# Patient Record
Sex: MALE | URBAN - METROPOLITAN AREA
[De-identification: names, ages, dates, MRNs, and addresses within clinical notes are randomized per-mention and may not be internally consistent; named-entity substitution may affect disease eponyms.]

---

## 2018-09-27 RX ORDER — NICOTINE POLACRILEX 2 MG
1 LOZENGE BUCCAL DAILY
COMMUNITY

## 2018-09-27 RX ORDER — ESCITALOPRAM OXALATE 10 MG/1
5 TABLET ORAL DAILY
COMMUNITY

## 2018-09-27 RX ORDER — QUETIAPINE FUMARATE 50 MG/1
50 TABLET, FILM COATED ORAL 2 TIMES DAILY
COMMUNITY

## 2018-09-27 RX ORDER — BUPROPION HYDROCHLORIDE 300 MG/1
300 TABLET ORAL EVERY MORNING
COMMUNITY

## 2018-09-27 RX ORDER — ZIPRASIDONE HYDROCHLORIDE 20 MG/1
20 CAPSULE ORAL 2 TIMES DAILY WITH MEALS
COMMUNITY

## 2018-09-27 RX ORDER — RISPERIDONE 0.5 MG/1
0.5 TABLET, FILM COATED ORAL EVERY EVENING
COMMUNITY

## 2018-09-27 RX ORDER — LEVOTHYROXINE SODIUM 0.07 MG/1
75 TABLET ORAL
COMMUNITY

## 2018-09-27 RX ORDER — CLONAZEPAM 0.5 MG/1
0.5 TABLET ORAL 2 TIMES DAILY
COMMUNITY

## 2018-09-27 NOTE — PRE-PROCEDURE INSTRUCTIONS
My Surgical Experience    The following information was developed to assist you to prepare for your operation  What do I need to do before coming to the hospital?   Arrange for a responsible person to drive you to and from the hospital    Arrange care for your children at home  Children are not allowed in the recovery areas of the hospital   Plan to wear clothing that is easy to put on and take off  If you are having shoulder surgery, wear a shirt that buttons or zippers in the front  Bathing  o Shower the evening before and the morning of your surgery with an antibacterial soap  Please refer to the Pre Op Showering Instructions for Surgery Patients Sheet   o Remove nail polish and all body piercing jewelry  o Do not shave any body part for at least 24 hours before surgery-this includes face, arms, legs and upper body  Food  o Nothing to eat or drink after midnight the night before your surgery  This includes candy and chewing gum  o Exception: If your surgery is after 12:00pm (noon), you may have clear liquids such as 7-Up®, ginger ale, apple or cranberry juice, Jell-O®, water, or clear broth until 8:00 am  o Do not drink milk or juice with pulp on the morning before surgery  o Do not drink alcohol 24 hours before surgery  Medicine  o Follow instructions you received from your surgeon about which medicines you may take on the day of surgery  o If instructed to take medicine on the morning of surgery, take pills with just a small sip of water  Call your prescribing doctor for specific infroamtion on what to do if you take insulin    What should I bring to the hospital?    Bring:  Abilio Zelaya or a walker, if you have them, for foot or knee surgery   A list of the daily medicines, vitamins, minerals, herbals and nutritional supplements you take   Include the dosages of medicines and the time you take them each day   Glasses, dentures or hearing aids   Minimal clothing; you will be wearing hospital sleepwear   Photo ID; required to verify your identity   If you have a Living Will or Power of , bring a copy of the documents   If you have an ostomy, bring an extra pouch and any supplies you use    Do not bring   Medicines or inhalers   Money, valuables or jewelry    What other information should I know about the day of surgery?  Notify your surgeons if you develop a cold, sore throat, cough, fever, rash or any other illness   Report to the Ambulatory Surgical/Same Day Surgery Unit   You will be instructed to stop at Registration only if you have not been pre-registered   Inform your  fi they do not stay that they will be asked by the staff to leave a phone number where they can be reached   Be available to be reached before surgery  In the event the operating room schedule changes, you may be asked to come in earlier or later than expected    *It is important to tell your doctor and others involved in your health care if you are taking or have been taking any non-prescription drugs, vitamins, minerals, herbals or other nutritional supplements  Any of these may interact with some food or medicines and cause a reaction      Pre-Surgery Instructions:   Medication Instructions    buPROPion (WELLBUTRIN XL) 300 mg 24 hr tablet Instructed patient per Anesthesia Guidelines   camphor-menthol (SARNA) lotion Instructed patient per Anesthesia Guidelines   clonazePAM (KlonoPIN) 0 5 mg tablet Instructed patient per Anesthesia Guidelines   escitalopram (LEXAPRO) 10 mg tablet Instructed patient per Anesthesia Guidelines   levothyroxine 75 mcg tablet Instructed patient per Anesthesia Guidelines   multivitamin-iron-minerals-folic acid (THERAPEUTIC-M) TABS tablet Instructed patient per Anesthesia Guidelines   QUEtiapine (SEROquel) 50 mg tablet Instructed patient per Anesthesia Guidelines   risperiDONE (RisperDAL) 0 5 mg tablet Instructed patient per Anesthesia Guidelines      Sunscreens (AVEENO DAILY MOISTURIZER) LOTN Instructed patient per Anesthesia Guidelines   ziprasidone (GEODON) 20 mg capsule Instructed patient per Anesthesia Guidelines  To take wellbutrin, klonopin, lexapro, synthroid, seroquel and geodon a tanvir   Of surgery

## 2018-09-28 ENCOUNTER — ANESTHESIA EVENT (OUTPATIENT)
Dept: PERIOP | Facility: HOSPITAL | Age: 38
End: 2018-09-28
Payer: COMMERCIAL

## 2018-09-28 NOTE — ANESTHESIA PREPROCEDURE EVALUATION
Disease of thyroid gland hypothyroid       Review of Systems/Medical History  Patient summary reviewed  Chart reviewed      Cardiovascular  Hypertension ,    Pulmonary  Negative pulmonary ROS        GI/Hepatic  Negative GI/hepatic ROS          Negative  ROS        Endo/Other  History of thyroid disease , hypothyroidism,      GYN       Hematology  Negative hematology ROS      Musculoskeletal  Negative musculoskeletal ROS        Neurology  Negative neurology ROS      Psychology   Anxiety,     Comment: Mood disorder (HCC)   OCD (obsessive compulsive disorder)          Physical Exam    Airway    Mallampati score: II  TM Distance: >3 FB  Neck ROM: full     Dental       Cardiovascular  Rhythm: regular, Rate: normal,     Pulmonary  Breath sounds clear to auscultation,     Other Findings        Anesthesia Plan  ASA Score- 2     Anesthesia Type- general with ASA Monitors  Additional Monitors:   Airway Plan: ETT  Plan Factors-    Induction- intravenous  Postoperative Plan-   Planned trial extubation    Informed Consent- Anesthetic plan and risks discussed with patient  I personally reviewed this patient with the CRNA  Discussed and agreed on the Anesthesia Plan with the CHELI Burton

## 2018-10-01 PROBLEM — K05.30 ADULT PERIODONTITIS: Chronic | Status: ACTIVE | Noted: 2018-10-01

## 2018-10-02 ENCOUNTER — HOSPITAL ENCOUNTER (OUTPATIENT)
Facility: HOSPITAL | Age: 38
Setting detail: OUTPATIENT SURGERY
Discharge: HOME HEALTH | End: 2018-10-02
Attending: DENTIST | Admitting: DENTIST
Payer: COMMERCIAL

## 2018-10-02 ENCOUNTER — HOSPITAL ENCOUNTER (OUTPATIENT)
Dept: RADIOLOGY | Facility: HOSPITAL | Age: 38
Setting detail: OUTPATIENT SURGERY
Discharge: HOME | End: 2018-10-02
Payer: COMMERCIAL

## 2018-10-02 ENCOUNTER — ANESTHESIA (OUTPATIENT)
Dept: PERIOP | Facility: HOSPITAL | Age: 38
End: 2018-10-02
Payer: COMMERCIAL

## 2018-10-02 VITALS
DIASTOLIC BLOOD PRESSURE: 73 MMHG | HEART RATE: 67 BPM | OXYGEN SATURATION: 98 % | RESPIRATION RATE: 18 BRPM | TEMPERATURE: 96.7 F | BODY MASS INDEX: 25.01 KG/M2 | SYSTOLIC BLOOD PRESSURE: 127 MMHG | WEIGHT: 165 LBS | HEIGHT: 68 IN

## 2018-10-02 PROBLEM — K05.30 ADULT PERIODONTITIS: Chronic | Status: RESOLVED | Noted: 2018-10-01 | Resolved: 2018-10-02

## 2018-10-02 RX ORDER — ONDANSETRON 2 MG/ML
INJECTION INTRAMUSCULAR; INTRAVENOUS AS NEEDED
Status: DISCONTINUED | OUTPATIENT
Start: 2018-10-02 | End: 2018-10-02 | Stop reason: SURG

## 2018-10-02 RX ORDER — PROPOFOL 10 MG/ML
INJECTION, EMULSION INTRAVENOUS AS NEEDED
Status: DISCONTINUED | OUTPATIENT
Start: 2018-10-02 | End: 2018-10-02 | Stop reason: SURG

## 2018-10-02 RX ORDER — MAGNESIUM HYDROXIDE 1200 MG/15ML
LIQUID ORAL AS NEEDED
Status: DISCONTINUED | OUTPATIENT
Start: 2018-10-02 | End: 2018-10-02 | Stop reason: HOSPADM

## 2018-10-02 RX ORDER — SODIUM CHLORIDE, SODIUM LACTATE, POTASSIUM CHLORIDE, CALCIUM CHLORIDE 600; 310; 30; 20 MG/100ML; MG/100ML; MG/100ML; MG/100ML
INJECTION, SOLUTION INTRAVENOUS CONTINUOUS PRN
Status: DISCONTINUED | OUTPATIENT
Start: 2018-10-02 | End: 2018-10-02 | Stop reason: SURG

## 2018-10-02 RX ORDER — CHLORHEXIDINE GLUCONATE 0.12 MG/ML
RINSE ORAL AS NEEDED
Status: DISCONTINUED | OUTPATIENT
Start: 2018-10-02 | End: 2018-10-02 | Stop reason: HOSPADM

## 2018-10-02 RX ORDER — CLINDAMYCIN PHOSPHATE 600 MG/50ML
600 INJECTION INTRAVENOUS ONCE
Status: COMPLETED | OUTPATIENT
Start: 2018-10-02 | End: 2018-10-02

## 2018-10-02 RX ORDER — CLINDAMYCIN PHOSPHATE 600 MG/50ML
600 INJECTION INTRAVENOUS
Status: DISCONTINUED | OUTPATIENT
Start: 2018-10-02 | End: 2018-10-02 | Stop reason: HOSPADM

## 2018-10-02 RX ORDER — ROCURONIUM BROMIDE 10 MG/ML
INJECTION, SOLUTION INTRAVENOUS AS NEEDED
Status: DISCONTINUED | OUTPATIENT
Start: 2018-10-02 | End: 2018-10-02 | Stop reason: SURG

## 2018-10-02 RX ORDER — ONDANSETRON 2 MG/ML
4 INJECTION INTRAMUSCULAR; INTRAVENOUS ONCE AS NEEDED
Status: DISCONTINUED | OUTPATIENT
Start: 2018-10-02 | End: 2018-10-02 | Stop reason: HOSPADM

## 2018-10-02 RX ORDER — BUPIVACAINE HYDROCHLORIDE AND EPINEPHRINE 5; 5 MG/ML; UG/ML
INJECTION, SOLUTION PERINEURAL AS NEEDED
Status: DISCONTINUED | OUTPATIENT
Start: 2018-10-02 | End: 2018-10-02 | Stop reason: HOSPADM

## 2018-10-02 RX ORDER — SODIUM CHLORIDE, SODIUM LACTATE, POTASSIUM CHLORIDE, CALCIUM CHLORIDE 600; 310; 30; 20 MG/100ML; MG/100ML; MG/100ML; MG/100ML
100 INJECTION, SOLUTION INTRAVENOUS CONTINUOUS
Status: DISCONTINUED | OUTPATIENT
Start: 2018-10-02 | End: 2018-10-02 | Stop reason: HOSPADM

## 2018-10-02 RX ORDER — FENTANYL CITRATE/PF 50 MCG/ML
25 SYRINGE (ML) INJECTION
Status: DISCONTINUED | OUTPATIENT
Start: 2018-10-02 | End: 2018-10-02 | Stop reason: HOSPADM

## 2018-10-02 RX ADMIN — PROPOFOL 200 MG: 10 INJECTION, EMULSION INTRAVENOUS at 09:28

## 2018-10-02 RX ADMIN — ONDANSETRON 4 MG: 2 INJECTION INTRAMUSCULAR; INTRAVENOUS at 09:40

## 2018-10-02 RX ADMIN — ROCURONIUM BROMIDE 35 MG: 10 INJECTION INTRAVENOUS at 09:28

## 2018-10-02 RX ADMIN — DEXAMETHASONE SODIUM PHOSPHATE 4 MG: 10 INJECTION INTRAMUSCULAR; INTRAVENOUS at 09:40

## 2018-10-02 RX ADMIN — CLINDAMYCIN PHOSPHATE 600 MG: 12 INJECTION, SOLUTION INTRAMUSCULAR; INTRAVENOUS at 09:32

## 2018-10-02 RX ADMIN — SODIUM CHLORIDE, SODIUM LACTATE, POTASSIUM CHLORIDE, AND CALCIUM CHLORIDE: .6; .31; .03; .02 INJECTION, SOLUTION INTRAVENOUS at 09:21

## 2018-10-02 NOTE — ANESTHESIA POSTPROCEDURE EVALUATION
Post-Op Assessment Note      CV Status:  Stable    Mental Status:  Alert and awake    Hydration Status:  Euvolemic    PONV Controlled:  Controlled    Airway Patency:  Patent    Post Op Vitals Reviewed: Yes          Staff: Anesthesiologist           /74 (10/02/18 1115)    Temp     Pulse 69 (10/02/18 1115)   Resp 18 (10/02/18 1115)    SpO2 96 % (10/02/18 1115)

## 2018-10-02 NOTE — OP NOTE
OPERATIVE REPORT  PATIENT NAME: Samaria Perla    :  1980  MRN: [id number]  Pt Location: [facility] OR ROOM 02    SURGERY DATE: 10/2/2018    Surgeon(s) and Role:     * Jessica Hurley, DMD - Primary    Preop Diagnosis:  Chronic periodontitis [K05 30]  Intellectual disability [F79]    Post-Op Diagnosis Codes:     * Chronic periodontitis [K05 30]     * Intellectual disability [F79]    Procedure(s) (LRB):  COMPLETE ORAL REHABILITATION, DENTAL EXAM, FULL MOUTH  X-RAYS, PLANING AND SCALING, GINGEVECTOMY, EXTRACTION TOOTH #15, IMPRESSIONS X2, PERIO CHARTING, PROPHYLAXIS, D/SENSE, FLUORIDE  (N/A)    Specimen(s):  * No specimens in log *    Estimated Blood Loss:   Minimal    Drains:       Anesthesia Type:   General    Operative Indications:  Chronic periodontitis [K05 30]  Intellectual disability [F79]  Moderate periodontal disease  Extensive decay # 15  Operative Findings:      Complications:   None    Procedure and Technique: Following the induction of IV inhalation anesthesia with nasotracheal intubation patient was prepped and draped for an intraoral dental surgical procedure  14 periapical dental radiographs were exposed and stored  Preliminary treatment plan formulated at that time  At this time a meeting was held with the legal guardian and parent Mrs Schoen gold to discuss treatment option for tooth 15  Radiographic evidence of extensive decay necessitate surgical extraction  She agreed to treatment as presented  Following insertion of a posterior pharyngeal pack with Ray-Jonathan gauze single tail moist and debridement of the dentition was completed utilizing ultrasonic scaling with the 30 KHZ Cavitron unit  Both large and slim tip inserts were utilized  Handheld curettes and scale ears were used where appropriate  Comprehensive evaluation of the oral cavity and dentition was then completed    This information was related to the aforementioned radiographic survey whereby definitive treatment plan was then set forth  Following administration of 3 6 mL Marcaine 0 5% epi concentration 1:200,000 the patient had surgical extraction of tooth 15  Gel-Foam and 2 (3-0) Vicryl sutures employed for closure  Patient also had adult prophylaxis, desensitizing Medica med and fluoride treatment performed for all remaining teeth  This patient received 600 mg of Cleocin intravenously during the surgery  Patient had both maxillary mandibular polyvinyl side lock seen impressions taken for study models  The posterior pharyngeal pack was removed and the oral pharyngeal area suction irrigated with sterile saline the usual manner  Patient left the operating room in satisfactory condition and was transported to PACU without incident       I was present for the entire procedure    Patient Disposition:  PACU     SIGNATURE: Randee Ellsworth DMD  DATE: October 2, 2018  TIME: 10:52 AM

## (undated) DEVICE — ACCLEAN PROPHY PASTE COARSE: Brand: HENRY SCHEIN

## (undated) DEVICE — DENTAL PACK: Brand: CARDINAL HEALTH

## (undated) DEVICE — DISPOS-A-BRUSH FINE BRISTLE

## (undated) DEVICE — GLOVE INDICATOR PI UNDERGLOVE SZ 8.5 BLUE

## (undated) DEVICE — GROUNDING PAD UNIVERSAL SLW

## (undated) DEVICE — ROCKER SWITCH PENCIL HOLSTER: Brand: VALLEYLAB

## (undated) DEVICE — DISPOS-A BRUSH STANDARD

## (undated) DEVICE — Device

## (undated) DEVICE — SPECIMEN SOCK - SHORT: Brand: MEDI-VAC

## (undated) DEVICE — SYRINGE BRIXTON AIRWATER SLEEVE CLEAR

## (undated) DEVICE — MICRODISSECTION NEEDLE: Brand: COLORADO

## (undated) DEVICE — ASTOUND STANDARD SURGICAL GOWN, XL: Brand: CONVERTORS

## (undated) DEVICE — TRAY IMPRESSION #1 LG/U PERFORATED

## (undated) DEVICE — GLOVE SRG BIOGEL 8

## (undated) DEVICE — SUT VICRYL 3-0 PS-1 18 IN J683G

## (undated) DEVICE — TRAY IMPRESSION #2 L/L PERFORATED

## (undated) DEVICE — MONOJECT NEEDLES 27 GA SHORT METAL HUB

## (undated) DEVICE — SURGIFOAM 7 X 12 SPONGE ABS